# Patient Record
Sex: MALE | Race: BLACK OR AFRICAN AMERICAN | NOT HISPANIC OR LATINO | URBAN - METROPOLITAN AREA
[De-identification: names, ages, dates, MRNs, and addresses within clinical notes are randomized per-mention and may not be internally consistent; named-entity substitution may affect disease eponyms.]

---

## 2021-08-08 ENCOUNTER — NURSE TRIAGE (OUTPATIENT)
Dept: ADMINISTRATIVE | Facility: CLINIC | Age: 31
End: 2021-08-08

## 2024-02-12 ENCOUNTER — HOSPITAL ENCOUNTER (EMERGENCY)
Facility: HOSPITAL | Age: 34
Discharge: HOME | End: 2024-02-12
Attending: EMERGENCY MEDICINE

## 2024-02-12 VITALS
HEART RATE: 68 BPM | WEIGHT: 180 LBS | RESPIRATION RATE: 18 BRPM | DIASTOLIC BLOOD PRESSURE: 72 MMHG | TEMPERATURE: 98 F | SYSTOLIC BLOOD PRESSURE: 126 MMHG | BODY MASS INDEX: 25.77 KG/M2 | OXYGEN SATURATION: 97 % | HEIGHT: 70 IN

## 2024-02-12 DIAGNOSIS — F19.920 DRUG INTOXICATION WITHOUT COMPLICATION: Primary | ICD-10-CM

## 2024-02-12 LAB — POCT GLUCOSE: 112 MG/DL (ref 70–110)

## 2024-02-12 PROCEDURE — 99284 EMERGENCY DEPT VISIT MOD MDM: CPT | Mod: 25

## 2024-02-12 PROCEDURE — 82962 GLUCOSE BLOOD TEST: CPT

## 2024-02-12 NOTE — DISCHARGE INSTRUCTIONS
Avoid excessive substance use.  If you feel you need help cutting back on alcohol or drug use you can visit [facility] at [facility] for help.  Schedule follow-up with a primary care physician for routine health maintenance..  Return to the emergency department for any new, worsening or significantly concerning symptoms.    Thank you for coming to our Emergency Department today. It is important to remember that some problems are difficult to diagnose and may not be found during your first visit. Be sure to follow up with your primary care doctor and review any labs/imaging that was performed with them. If you do not have a primary care doctor, you may contact the one listed on your discharge paperwork or you may also call the [facility] Appointment Desk at [phone] to schedule an appointment with one.     All medications may potentially have side effects and it is impossible to predict which medications may give you side effects. If you feel that you are having a negative effect of any medication you should immediately stop taking them and seek medical attention.    Return to the ER with any questions/concerns, new/concerning symptoms, worsening or failure to improve. Do not drive or make any important decisions for 24 hours if you have received any pain medications, sedatives or mood altering drugs during your ER visit.

## 2024-02-12 NOTE — ED PROVIDER NOTES
Encounter Date: 2/11/2024       History     Chief Complaint   Patient presents with    AMS     Pt found down in the bushes at [employer]. Had a bag of white powder. Pt with GCS os 8 able to arouse with painful stimuli. VSS No narcan given. Received 900ml of NS in route.      34yo male presents to the emergency department via EMS for evaluation after he was [place] the bushes [place].  Per EMS patient had a bag of white powder on him.  He is saturating normally on room air.  He has no gross evidence of head trauma.      Review of patient's allergies indicates:  No Known Allergies  History reviewed. No pertinent past medical history.  History reviewed. No pertinent surgical history.  No family history on file.  Social History     Tobacco Use    Smoking status: Unknown     Review of Systems   Unable to perform ROS: Mental status change       Physical Exam     Initial Vitals [02/12/24 0000]   BP Pulse Resp Temp SpO2   124/76 67 (!) 22 98 °F (36.7 °C) 96 %      MAP       --         Physical Exam    Nursing note and vitals reviewed.  Constitutional: He is not diaphoretic. No distress.   HENT:   Head: Normocephalic and atraumatic.   Protecting airway   Eyes: Conjunctivae and EOM are normal. No scleral icterus.   Neck: Neck supple. No tracheal deviation present.   No midline C-spine tenderness or step-off   Normal range of motion.  Cardiovascular:  Normal rate, regular rhythm and intact distal pulses.           Pulmonary/Chest: No stridor. No respiratory distress. He has no wheezes. He has no rhonchi.   Abdominal: Abdomen is soft. He exhibits no distension. There is no abdominal tenderness.   Musculoskeletal:         General: No tenderness or edema.      Cervical back: Normal range of motion and neck supple.     Neurological: No cranial nerve deficit or sensory deficit. GCS eye subscore is 2. GCS verbal subscore is 1. GCS motor subscore is 5.   Moving all extremities   Skin: Skin is warm and dry.   Psychiatric:  He has a normal mood and affect.         ED Course   Procedures  Labs Reviewed   POCT GLUCOSE - Abnormal; Notable for the following components:       Result Value    POCT Glucose 112 (*)     All other components within normal limits   POCT GLUCOSE MONITORING CONTINUOUS          Imaging Results              CT Head Without Contrast (Final result)  Result time 02/12/24 03:09:51      Final result by Domenic Valdivia MD (02/12/24 03:09:51)                   Impression:      Artifact is present diminishing sensitivity of the exam, when accounting for artifact there is no evidence for acute intracranial process.      Electronically signed by: Domenic Valdivia  Date:    02/12/2024  Time:    03:09               Narrative:    EXAMINATION:  CT HEAD WITHOUT CONTRAST    CLINICAL HISTORY:  Head trauma, abnormal mental status (Age 19-64y);    TECHNIQUE:  Low dose axial images were obtained through the head.  Coronal and sagittal reformations were also performed. Contrast was not administered.    COMPARISON:  None.    FINDINGS:  There is motion artifact present, diminishing sensitivity of the exam.  When accounting for artifact there is no evidence for intracranial mass, mass effect or midline shift, no evidence for acute intracranial hemorrhage.  The appearance of the ventricular system, sulcal pattern and parenchymal attenuation character appears appropriate for age.  Appropriate CSF spaces are seen at the skull base.    The mastoid air cells appear well aerated.  The paranasal sinuses appear appropriate when accounting for artifact, as do the orbits.  The osseous structures appear intact.                                       CT Cervical Spine Without Contrast (Final result)  Result time 02/12/24 03:09:58      Final result by Domenic Valdivia MD (02/12/24 03:09:58)                   Impression:      Artifact diminishes the sensitivity of the exam, however when accounting for artifact there is no evidence for acute cervical  spine fracture deformity.      Electronically signed by: Domenic Valdivia  Date:    02/12/2024  Time:    03:09               Narrative:    EXAMINATION:  CT CERVICAL SPINE WITHOUT CONTRAST    CLINICAL HISTORY:  Neck trauma, intoxicated or obtunded (Age >= 16y);    TECHNIQUE:  Low dose axial images, sagittal and coronal reformations were performed though the cervical spine.  Contrast was not administered.    COMPARISON:  None    FINDINGS:  There is motion artifact present, this diminishes image quality and diminishes the sensitivity of the exam.  When accounting for artifact there is no evidence for high-grade spondylolisthesis and there is no evidence for high-grade or acute compression fracture deformity.  There is no evidence for facet dislocation or facet fracture deformity.  The occipital condyles articulate appropriately with the superior articular facets of C1 at the craniocervical junction.    There is no evidence for high-grade spinal canal stenosis and no evidence for large focal disc protrusion.  When accounting for artifact there is no evidence for acute cervical spine fracture deformity.                                       Medications - No data to display  Medical Decision Making  Differential diagnosis includes but not limited to:  Intoxication, intracranial injury C-spine injury, hypoglycemia, low clinical suspicion of electrolyte abnormality normal vital signs    Patient has no evidence of head trauma.  Vitals within acceptable ranges.  CT brain without acute intracranial process.  CT cervical spine without acute traumatic injury.  Patient allowed to rest in the emergency department for several hours.  On reassessment he is more easily arousable.  He now has a GCS of 15.  He is ambulatory without ataxia.  He has no focal neurological deficits.  Symptoms appear most consistent with intoxication.  Patient is stable for discharge to follow up with his primary physician.  Counseled against excessive  substance use.  Referred for substance use counseling    Amount and/or Complexity of Data Reviewed  Radiology: ordered.                     This chart was completed using dictation software, as a result there may be some transcription errors.                  Clinical Impression:  Final diagnoses:  [F19.920] Drug intoxication without complication (Primary)          ED Disposition Condition    Discharge Stable          ED Prescriptions    None       Follow-up Information       Follow up With Specialties Details Why Contact Info    [facility] -  Schedule an appointment as soon as possible for a visit   [address]  [place] [address]  [phone]      [facility], Psychiatry, Psychology Schedule an appointment as soon as possible for a visit   [address] [facility] [address]  [phone]               Darnell Quesada MD  02/12/24 0510

## 2024-02-12 NOTE — ED TRIAGE NOTES
Holland Ron is a 33 y.o male to ED by EMS for AMS.  According to EMS, pt was found down in the bushes at [employer].  Unknown white substance found in sock by EMS.  GCS 8. Responds to painful stimuli.  Arrives with IV receiving NS.